# Patient Record
Sex: MALE | Race: WHITE
[De-identification: names, ages, dates, MRNs, and addresses within clinical notes are randomized per-mention and may not be internally consistent; named-entity substitution may affect disease eponyms.]

---

## 2017-10-24 ENCOUNTER — HOSPITAL ENCOUNTER (OUTPATIENT)
Dept: HOSPITAL 92 - ERS | Age: 77
Setting detail: OBSERVATION
LOS: 2 days | Discharge: HOME | End: 2017-10-26
Attending: FAMILY MEDICINE | Admitting: FAMILY MEDICINE
Payer: MEDICARE

## 2017-10-24 VITALS — BODY MASS INDEX: 30.6 KG/M2

## 2017-10-24 DIAGNOSIS — Z98.890: ICD-10-CM

## 2017-10-24 DIAGNOSIS — M19.90: ICD-10-CM

## 2017-10-24 DIAGNOSIS — N40.0: ICD-10-CM

## 2017-10-24 DIAGNOSIS — Z88.0: ICD-10-CM

## 2017-10-24 DIAGNOSIS — F32.9: ICD-10-CM

## 2017-10-24 DIAGNOSIS — I25.10: ICD-10-CM

## 2017-10-24 DIAGNOSIS — Z95.818: ICD-10-CM

## 2017-10-24 DIAGNOSIS — E11.9: ICD-10-CM

## 2017-10-24 DIAGNOSIS — J44.9: ICD-10-CM

## 2017-10-24 DIAGNOSIS — G89.4: ICD-10-CM

## 2017-10-24 DIAGNOSIS — H40.9: ICD-10-CM

## 2017-10-24 DIAGNOSIS — Z98.1: ICD-10-CM

## 2017-10-24 DIAGNOSIS — G43.909: ICD-10-CM

## 2017-10-24 DIAGNOSIS — K21.9: ICD-10-CM

## 2017-10-24 DIAGNOSIS — I25.2: ICD-10-CM

## 2017-10-24 DIAGNOSIS — I50.9: ICD-10-CM

## 2017-10-24 DIAGNOSIS — R07.89: Primary | ICD-10-CM

## 2017-10-24 DIAGNOSIS — Z79.899: ICD-10-CM

## 2017-10-24 DIAGNOSIS — F17.290: ICD-10-CM

## 2017-10-24 DIAGNOSIS — D53.9: ICD-10-CM

## 2017-10-24 DIAGNOSIS — Z79.84: ICD-10-CM

## 2017-10-24 DIAGNOSIS — Z79.891: ICD-10-CM

## 2017-10-24 DIAGNOSIS — I11.0: ICD-10-CM

## 2017-10-24 DIAGNOSIS — Z79.02: ICD-10-CM

## 2017-10-24 DIAGNOSIS — E78.5: ICD-10-CM

## 2017-10-24 LAB
ALP SERPL-CCNC: 87 U/L (ref 40–150)
ALT SERPL W P-5'-P-CCNC: 14 U/L (ref 8–55)
ANION GAP SERPL CALC-SCNC: 13 MMOL/L (ref 10–20)
AST SERPL-CCNC: 12 U/L (ref 5–34)
BASOPHILS # BLD AUTO: 0.1 THOU/UL (ref 0–0.2)
BASOPHILS NFR BLD AUTO: 1.1 % (ref 0–1)
BILIRUB SERPL-MCNC: 0.6 MG/DL (ref 0.2–1.2)
BUN SERPL-MCNC: 18 MG/DL (ref 8.4–25.7)
CALCIUM SERPL-MCNC: 9.6 MG/DL (ref 7.8–10.44)
CHLORIDE SERPL-SCNC: 104 MMOL/L (ref 98–107)
CK SERPL-CCNC: 75 U/L (ref 30–200)
CO2 SERPL-SCNC: 27 MMOL/L (ref 23–31)
CREAT CL PREDICTED SERPL C-G-VRATE: 0 ML/MIN (ref 70–130)
EOSINOPHIL # BLD AUTO: 0.1 THOU/UL (ref 0–0.7)
EOSINOPHIL NFR BLD AUTO: 0.6 % (ref 0–10)
GLOBULIN SER CALC-MCNC: 2.5 G/DL (ref 2.4–3.5)
HCT VFR BLD CALC: 38.5 % (ref 42–52)
LIPASE SERPL-CCNC: 53 U/L (ref 8–78)
LYMPHOCYTES # BLD: 3 THOU/UL (ref 1.2–3.4)
LYMPHOCYTES NFR BLD AUTO: 23.4 % (ref 21–51)
MONOCYTES # BLD AUTO: 0.8 THOU/UL (ref 0.11–0.59)
MONOCYTES NFR BLD AUTO: 6.2 % (ref 0–10)
NEUTROPHILS # BLD AUTO: 8.8 THOU/UL (ref 1.4–6.5)
RBC # BLD AUTO: 3.9 MILL/UL (ref 4.7–6.1)
TROPONIN I SERPL DL<=0.01 NG/ML-MCNC: (no result) NG/ML (ref ?–0.03)
WBC # BLD AUTO: 12.8 THOU/UL (ref 4.8–10.8)

## 2017-10-24 PROCEDURE — 82553 CREATINE MB FRACTION: CPT

## 2017-10-24 PROCEDURE — 82550 ASSAY OF CK (CPK): CPT

## 2017-10-24 PROCEDURE — 90471 IMMUNIZATION ADMIN: CPT

## 2017-10-24 PROCEDURE — 80061 LIPID PANEL: CPT

## 2017-10-24 PROCEDURE — 99285 EMERGENCY DEPT VISIT HI MDM: CPT

## 2017-10-24 PROCEDURE — 71010: CPT

## 2017-10-24 PROCEDURE — 93005 ELECTROCARDIOGRAM TRACING: CPT

## 2017-10-24 PROCEDURE — 36415 COLL VENOUS BLD VENIPUNCTURE: CPT

## 2017-10-24 PROCEDURE — A4216 STERILE WATER/SALINE, 10 ML: HCPCS

## 2017-10-24 PROCEDURE — 83880 ASSAY OF NATRIURETIC PEPTIDE: CPT

## 2017-10-24 PROCEDURE — 78452 HT MUSCLE IMAGE SPECT MULT: CPT

## 2017-10-24 PROCEDURE — 93017 CV STRESS TEST TRACING ONLY: CPT

## 2017-10-24 PROCEDURE — A9500 TC99M SESTAMIBI: HCPCS

## 2017-10-24 PROCEDURE — 90732 PPSV23 VACC 2 YRS+ SUBQ/IM: CPT

## 2017-10-24 PROCEDURE — 82962 GLUCOSE BLOOD TEST: CPT

## 2017-10-24 PROCEDURE — G0009 ADMIN PNEUMOCOCCAL VACCINE: HCPCS

## 2017-10-24 PROCEDURE — 85027 COMPLETE CBC AUTOMATED: CPT

## 2017-10-24 PROCEDURE — G0378 HOSPITAL OBSERVATION PER HR: HCPCS

## 2017-10-24 PROCEDURE — 84484 ASSAY OF TROPONIN QUANT: CPT

## 2017-10-24 PROCEDURE — 85025 COMPLETE CBC W/AUTO DIFF WBC: CPT

## 2017-10-24 PROCEDURE — 83690 ASSAY OF LIPASE: CPT

## 2017-10-24 PROCEDURE — 80053 COMPREHEN METABOLIC PANEL: CPT

## 2017-10-24 PROCEDURE — 36416 COLLJ CAPILLARY BLOOD SPEC: CPT

## 2017-10-24 PROCEDURE — 94760 N-INVAS EAR/PLS OXIMETRY 1: CPT

## 2017-10-24 PROCEDURE — 80048 BASIC METABOLIC PNL TOTAL CA: CPT

## 2017-10-24 PROCEDURE — 85007 BL SMEAR W/DIFF WBC COUNT: CPT

## 2017-10-24 PROCEDURE — 96374 THER/PROPH/DIAG INJ IV PUSH: CPT

## 2017-10-24 PROCEDURE — 93306 TTE W/DOPPLER COMPLETE: CPT

## 2017-10-24 NOTE — RAD
FRONTAL RADIOGRAPH CHEST 

10/24/17

 

COMPARISON:  

9/11/12

 

HISTORY: 

Chest pain.

 

FINDINGS:  

There is increased linear interstitial density and pulmonary hyperinflation, stable. Cervical spine 
hardware is present. Dorsal column stimulator lead overlies the mid thoracic spine. There is atheros
clerotic calcification in the aortic arch. There is no pneumothorax, pleural fluid, focal consolidat
ion or alveolar edema. 

 

IMPRESSION:  

No acute findings. Chronic appearing findings as described above. 

 

POS: [facility]

## 2017-10-25 LAB
ANION GAP SERPL CALC-SCNC: 9 MMOL/L (ref 10–20)
BUN SERPL-MCNC: 17 MG/DL (ref 8.4–25.7)
CALCIUM SERPL-MCNC: 9.4 MG/DL (ref 7.8–10.44)
CHLORIDE SERPL-SCNC: 105 MMOL/L (ref 98–107)
CHOLEST SERPL-MCNC: 122 MG/DL
CO2 SERPL-SCNC: 29 MMOL/L (ref 23–31)
CREAT CL PREDICTED SERPL C-G-VRATE: 0 ML/MIN (ref 70–130)
HCT VFR BLD CALC: 34 % (ref 42–52)
LDLC SERPL CALC-MCNC: 70 MG/DL
RBC # BLD AUTO: 3.41 MILL/UL (ref 4.7–6.1)
TROPONIN I SERPL DL<=0.01 NG/ML-MCNC: (no result) NG/ML (ref ?–0.03)
TROPONIN I SERPL DL<=0.01 NG/ML-MCNC: (no result) NG/ML (ref ?–0.03)
WBC # BLD AUTO: 8.6 THOU/UL (ref 4.8–10.8)

## 2017-10-25 RX ADMIN — MORPHINE SULFATE SCH MG: 10 SOLUTION ORAL at 20:31

## 2017-10-25 RX ADMIN — MORPHINE SULFATE SCH: 10 SOLUTION ORAL at 21:44

## 2017-10-25 RX ADMIN — MORPHINE SULFATE SCH: 10 SOLUTION ORAL at 18:03

## 2017-10-25 RX ADMIN — MORPHINE SULFATE SCH MG: 10 SOLUTION ORAL at 18:27

## 2017-10-25 RX ADMIN — MORPHINE SULFATE SCH: 10 SOLUTION ORAL at 22:57

## 2017-10-25 RX ADMIN — MORPHINE SULFATE SCH: 10 SOLUTION ORAL at 20:02

## 2017-10-25 NOTE — HP
PRIMARY CARE PROVIDER:  [facility] in [place], [place].

 

PRIMARY CARDIOLOGIST:  Forrest Hoover M.D.

 

CHIEF COMPLAINT:  Chest pain.

 

HISTORY OF PRESENT ILLNESS:  This is a 77-year-old  male who was referred to [facility] Emergency Room by Dr. Hoover after patient was seen in his office on 10/24/20
17.  The patient states he has had recurrent chest pain requiring multiple doses of nitroglycerin at
 home over the last several weeks concerning him for worsening heart failure or coronary artery dise
ase.  The patient does admit to a longstanding history of coronary artery disease status post cardia
c stent placement in 2013 to the obtuse marginal branch.  The patient states he has been compliant w
ith his chronic medication regimen; however, apparently was placed on hospice, unbeknownst to him by
 a provider out of [place], [place] and states he was on hospice for approximately 7 months before be
ing released.  The patient is unsure why he was on hospice and states he was unable to secure follow
 up to Dr. Hoover during the last several months to assess his coronary status.  The patient michelle
es any specific fever, chills, increased cough, congestion, but does states that he has \\"black stepan
g\\" and COPD and uses home inhalers.  The patient denies any recent exposure history, trauma, injur
y or decreased exercise tolerance.  The patient does state he ambulates with the use of four-wheeled
 rolling walker, but is short-winded with mild exertion.  The patient denies any specific increased 
lower extremity edema, increased weight gain or dietary indiscretion.  The patient states that he wa
s en route to follow up with his cardiologist when he developed worsening chest pain requiring 2 dos
es of sublingual nitroglycerin.  The patient presented to the emergency room and receiving a third d
ose of nitroglycerin with resolution of symptoms.  Initial EKG performed in the emergency room shows
 sinus mechanism without acute ST-T wave changes.  The patient was referred to the Hospitalist Karri mir for evaluation.

 

PAST MEDICAL HISTORY:

1.  Coronary artery disease status post cardiac stent placement to the obtuse marginal in 2013.

2.  Diabetes mellitus type 2 on oral hypoglycemics.

3.  History of myocardial infarction.

4.  Hypertension.

5.  Dyslipidemia.

6.  Osteoarthritis.

7.  Glaucoma.

8.  Depression.

9.  History of migraine headaches.

10.  Chronic obstructive pulmonary disease/\\"black lung\\".

 

PAST SURGICAL HISTORY:

1.  Status post cardiac stent placement to the obtuse marginal in 2013.

2.  Status post pancreatic surgery.

3.  Status post anterior cervical diskectomy with arthrodesis and fusion of the cervical spine.

 

CURRENT MEDICATIONS:

1.  BuSpar 10 mg p.o. t.i.d.

2.  Plavix 75 mg 1 tab p.o. daily.

3.  Donepezil 10 mg 1 tab p.o. at bedtime.

4.  Protonix 40 mg p.o. daily.

5.  Sertraline 150 mg p.o. daily.

6.  Tamsulosin 0.4 mg p.o. at bedtime.

7.  Trazodone 100 mg 2 tablets p.o. at bedtime.

8.  Oxycodone/acetaminophen 7.5/325 mg 1 tab p.o. q.4-6 h. p.r.n.

9.  Methadone 10 mg 1 tab p.o. t.i.d.

10.  Morphine sulfate, dose unknown.

 

ALLERGIES:  PENICILLIN.

 

FAMILY HISTORY:  Positive for hypertension and coronary artery disease.

 

SOCIAL HISTORY:  The patient resides near [facility].  Smokes cigars daily.  No c
urrent alcohol or illicit drug use.  [employer] .

 

REVIEW OF SYSTEMS:  The following complete review of systems was negative, unless otherwise mentione
d in the HPI or below:

Constitutional:  Weight loss or gain, ability to conduct usual activities.

Skin:  Rash, itching.

Eyes:  Double vision, pain.

ENT/Mouth:  Nose bleeding, neck stiffness, pain, tenderness.

Cardiovascular:  Palpitations, dyspnea on exertion, orthopnea.

Respiratory:  Shortness of breath, wheezing, cough, hemoptysis, fever or night sweats.

Gastrointestinal:  Poor appetite, abdominal pain, heartburn, nausea, vomiting, constipation, or diar
bhumi.

Genitourinary:  Urgency, frequency, dysuria, nocturia.

Musculoskeletal:  Pain, swelling.

Neurologic/Psychiatric:  Anxiety, depression.

Allergy/Immunologic:  Skin rash, bleeding tendency.

Otherwise, negative except as stated per HPI.

 

PHYSICAL EXAMINATION:

VITAL SIGNS:  Currently, blood pressure 117/68, pulse 85, respiratory rate 22, temperature 97.8 degr
ees Fahrenheit, O2 saturation 99% on 2 liters per minute by nasal cannula.

GENERAL APPEARANCE:  This is a 77-year-old  male, alert and oriented x3, pleasant, conversa
nt, in no acute distress.

HEENT:  Pupils are equal, round, and reactive to light and accommodation.  Extraocular muscles are i
ntact.  No scleral icterus, no conjunctival injection.  Nares patent.  OP is clear.  Teeth in fair r
epair.

NECK:  Supple, no cervical adenopathy, no thyromegaly, no carotid bruits, no JVD appreciated.  Cervi
nicole spine with full active and passive range of motion.

CHEST:  Lungs are clear to auscultation bilaterally with diminished breath sounds in the bases.

CARDIOVASCULAR:  S1, S2 with distant heart sounds.

ABDOMEN:  Obese, soft, nontender, nondistended.  Bowel sounds are positive in all four quadrants.  T
here is no hepatosplenomegaly, no abdominal bruits, no rebound or guarding appreciated.

EXTREMITIES:  Warm and dry with fair turgor.  No clubbing, cyanosis or asymmetric edema appreciated.
  Pulses palpable distally at the dorsalis pedis, posterior tibial, and popliteal arteries bilateral
ly.  Capillary refill less than 2 seconds.

NEUROLOGIC:  Cranial nerves II-XII are grossly intact.  No focal or lateralizing signs appreciated.

 

PERTINENT LABORATORY AND X-RAY FINDINGS:  Complete metabolic profile within normal limits.  Troponin
 I negative x1.  BNP is 41.  CBC showed a white blood cell count 12.8, hemoglobin 13, hematocrit 39,
 MCV 99, platelet count 239 with normal differential.  Portable chest x-ray dated 10/24/2017 showed 
chronic changes without acute process.  EKG dated 10/24/2017 by my interpretation shows sinus mechan
ism with heart rates in the 60s.  Normal R-wave progression noted in the precordial leads.  Left axi
s deviation noted.  No acute ST-T wave changes appreciated.

 

ASSESSMENT AND PLAN:

1.  Chest pain.  The patient will be placed in observation status.  We will continue aspirin 325 mg 
daily.  Serial troponin I q.3 hours x2.  We will obtain 2D transthoracic echocardiogram.  Consider c
onsultation with Cardiology Service if chest pain recurs.  Nitroglycerin 0.4 mg sublingually every 5
 minutes p.r.n. chest pain.

2.  Chronic obstructive pulmonary disease.  Chronic and stable.  No evidence to suggest acute exacer
bation.  Continue supportive measures.  Bronchodilator therapy p.r.n.

3.  Coronary artery disease, status post cardiac stent placement to the obtuse marginal in 2013.  Co
ntinue Plavix 75 mg p.o. daily.  See #1 above.  No evidence to suggest acute coronary syndrome.

4.  Chronic pain syndrome.  We will confirm home pain medication regimen.  Symptomatic and supportiv
e measures.

5.  Gastroesophageal reflux disease.  Continue Pepcid 20 mg p.o. b.i.d.

6.  Prophylaxis.  Sequential compression devices while in bed.  Pepcid 20 mg p.o. b.i.d.

7.  Code status is full.  Surrogate medical decision maker is the patient's son.

## 2017-10-25 NOTE — NM
CARDIAC SPECT:

10/25/17

 

HISTORY: 

77-year-old male with chest pain, coronary artery disease, stent placement, CHF, diabetes, hypertens
ion, dyslipidemia.

 

TECHNIQUE:

A myocardial perfusion scan was performed using the single isotope one day protocol with technetium 
99m Sestamibi. 9 millicuries was injected intravenously for the rest exam followed by 30 millicuries
 for the stress study. Pharmacologic stress with Lexiscan was monitored and interpreted by MAUREEN Nieves,
 nurse practitioner.

 

FINDINGS:

Homogeneous tracer distribution is seen in the myocardial segments on stress and rest images without
 fixed or reversible defects. 

 

Gating could not be performed due to patient's inconsistent rate and rhythm, thus the LVEF could not
 be calculated and the wall motion could not be evaluated.

 

IMPRESSION:  

Normal myocardial perfusion.

 

POS: [facility]

## 2017-10-25 NOTE — PDOC.PN
- Subjective


Encounter Start Date: 10/25/17


Encounter Start Time: 16:00


Subjective: f/u for CP and CST showing no reversible defects. No current CP.


-: c/o chronic back pain symptoms. 





- Objective


Resuscitation Status: 


 











Resuscitation Status           FULL:Full Resuscitation














MAR Reviewed: Yes


Vital Signs & Weight: 


 Vital Signs (12 hours)











  Temp Pulse Resp BP BP Pulse Ox


 


 10/25/17 14:49  97.6 F  92  20   122/66  98


 


 10/25/17 13:07     130/74  


 


 10/25/17 11:13  97.8 F  76  20   120/66  97


 


 10/25/17 08:42  97.6 F  86  22 H   134/73  97


 


 10/25/17 08:00  97.6 F  86  22 H  134/73   97








 Weight











Weight                         245 lb














I&O: 


 











 10/24/17 10/25/17 10/26/17





 06:59 06:59 06:59


 


Intake Total  260 


 


Output Total  1 


 


Balance  259 











Result Diagrams: 


 10/25/17 03:18





 10/25/17 03:18


Additional Labs: 


 Accuchecks











  10/25/17 10/25/17 10/24/17





  10:46 06:38 22:00


 


POC Glucose  106  108  109








 Laboratory Tests











  10/24/17 10/24/17 10/25/17





  16:00 22:54 00:25


 


WBC  12.8 H  


 


Hgb  13.0 L  


 


Troponin I   Less than  0.010  Less than  0.010


 


Triglycerides   


 


Cholesterol   


 


LDL Cholesterol, Calc   


 


HDL Cholesterol   














  10/25/17 10/25/17





  03:18 03:18


 


WBC  


 


Hgb  


 


Troponin I  Less than  0.010 


 


Triglycerides   106


 


Cholesterol   122


 


LDL Cholesterol, Calc   70


 


HDL Cholesterol   31











Radiology Reviewed by me: Yes (CST - no reversible or fixed ischemia, no EF 

calculated)


EKG Reviewed by me: Yes (Tele - SR in 60's)





Phys Exam





- Physical Examination


Constitutional: NAD


HEENT: PERRLA, oral pharynx no lesions


Neck: no JVD, supple


Respiratory: no wheezing, clear to auscultation bilateral


Cardiovascular: RRR


Gastrointestinal: soft, non-tender, no distention, positive bowel sounds


Musculoskeletal: no edema, pulses present


Neurological: normal sensation, moves all 4 limbs


Psychiatric: A&O x 3


Skin: normal turgor, cap refill <2 seconds





Dx/Plan


(1) Chest pain


Code(s): R07.9 - CHEST PAIN, UNSPECIFIED   Status: Acute   Comment: CST 

negative for reversible ischemia, await 2D echo results   





(2) COPD (chronic obstructive pulmonary disease)


Status: Chronic   Comment: Stable, no exacerbation, continue low-flow O2 via NC

   





(3) Chronic back pain


Code(s): M54.9 - DORSALGIA, UNSPECIFIED; G89.29 - OTHER CHRONIC PAIN   Status: 

Chronic   Comment: Resume home pain medication regimen   





(4) Macrocytic anemia


Code(s): D53.9 - NUTRITIONAL ANEMIA, UNSPECIFIED   Status: Chronic   Comment: 

Check B12/Folate in am   





- Plan


, out of bed/ambulate, DVT proph w/SCDs


Stable overall


-: Resume home Plavix 75mg daily


-: D/C ASA


-: Resume home pain medications including Methadone


-: Await 2D echo results





* Likely home in am

## 2017-10-26 VITALS — DIASTOLIC BLOOD PRESSURE: 60 MMHG | SYSTOLIC BLOOD PRESSURE: 127 MMHG

## 2017-10-26 VITALS — TEMPERATURE: 98.4 F

## 2017-10-26 RX ADMIN — MORPHINE SULFATE SCH: 10 SOLUTION ORAL at 06:01

## 2017-10-26 RX ADMIN — MORPHINE SULFATE SCH: 10 SOLUTION ORAL at 15:55

## 2017-10-26 RX ADMIN — MORPHINE SULFATE SCH MG: 10 SOLUTION ORAL at 09:51

## 2017-10-26 RX ADMIN — MORPHINE SULFATE SCH: 10 SOLUTION ORAL at 04:27

## 2017-10-26 RX ADMIN — MORPHINE SULFATE SCH MG: 10 SOLUTION ORAL at 06:04

## 2017-10-26 RX ADMIN — MORPHINE SULFATE SCH: 10 SOLUTION ORAL at 09:23

## 2017-10-26 RX ADMIN — MORPHINE SULFATE SCH: 10 SOLUTION ORAL at 12:44

## 2017-10-26 RX ADMIN — MORPHINE SULFATE SCH: 10 SOLUTION ORAL at 01:12

## 2017-10-26 RX ADMIN — MORPHINE SULFATE SCH: 10 SOLUTION ORAL at 09:51

## 2017-10-26 RX ADMIN — MORPHINE SULFATE SCH: 10 SOLUTION ORAL at 14:02

## 2017-10-26 RX ADMIN — MORPHINE SULFATE SCH: 10 SOLUTION ORAL at 14:00

## 2017-10-26 NOTE — DIS
DATE OF ADMISSION:  10/24/2017

 

DATE OF DISCHARGE:  10/26/2017

 

DISCHARGE DIAGNOSES:

1.  Chest pain, noncardiac.

2.  History of coronary artery disease with currently normal nuclear stress test and echocardiogram 
as outlined below.

3.  Chronic back pain.

4.  Chronic obstructive pulmonary disease.

5.  Chronic macrocytic anemia.

6.  Diabetes mellitus, type 2.

7.  Benign prostatic hypertrophy.

8.  Gastroesophageal reflux disease.

 

CONSULTATIONS:  None.

 

PROCEDURES:

1.  A 2D echocardiogram:  Performed on 10/25/2017:  EF 50%-55%, E/A reversal suggesting diastolic dy
sfunction, mild-to-moderate TR, inferior wall hypokinesis.

2.  Nuclear stress test:  No reversible ischemia.

 

HISTORY AND PHYSICAL:  Mr. Gaitan is a 77-year-old gentleman, [facility]  He has a hi
story of coronary artery disease, COPD, and a past history of tobacco abuse.  He was admitted for ch
est pain.  Negative biomarkers in the ER.

 

HOSPITAL COURSE:  The patient was seen and examined and admitted by Dr. Estrada.  Serial cardiac biomar
kers were unremarkable.  A nuclear stress test was ordered as well as 2D echocardiogram.  Those repo
rts returned this morning with a normal stress test and echocardiogram with a chronic inferior wall 
hypokinesis, otherwise preserved EF from previous.  The patient had no further chest pain and was st
able for discharge with outpatient followup.

 

PHYSICAL EXAMINATION:  The patient was seen and examined on the day of discharge. 

 

Discharge plan and disposition were discussed with the patient face-to-face at the bedside.

 

DISCHARGE MEDICATIONS:  Patient is being discharged on known medications.

1.  Albuterol 1 puff inhaled q.i.d. p.r.n.

2.  Plavix 75 mg daily.

3.  Aricept 5 mg at bedtime.

4.  Proscar 5 mg daily.

5.  Lasix 20 mg daily.

6.  DuoNeb 3 nebs q.i.d.

7.  Losartan 25 mg daily.

8.  Methadone 10 mg p.o. t.i.d.

9.  Metoprolol tartrate 25 mg p.o. b.i.d.

10.  MSIR 1 mg p.o. 1 hour p.r.n.

11.  Oxycodone/acetaminophen 7.5/325 one tablet b.i.d.

12.  Protonix 40 mg daily.

13.  K-Dur 20 mEq daily.

14.  Zoloft 150 mg p.o. at bedtime.

15.  Flomax 0.4 mg daily.

16.  Buspirone 10 mg p.o. t.i.d.

17.  Metformin 500 mg p.o. b.i.d. with meals.

15.  Trazodone 200 mg p.o. q.h.s.

 

FOLLOWUP APPOINTMENTS:

1[facility]  [facility]

2.  All other medical specialists as previously scheduled.

 

DISCHARGE CONDITION:  Good.

 

DISPOSITION:  The patient being discharged home via private vehicle.

 

The patient is instructed to return to the emergency department if he has any further chest pains.

## 2019-01-09 ENCOUNTER — HOSPITAL ENCOUNTER (OUTPATIENT)
Dept: HOSPITAL 92 - CT | Age: 79
Discharge: HOME | End: 2019-01-09
Attending: INTERNAL MEDICINE
Payer: MEDICARE

## 2019-01-09 DIAGNOSIS — I71.2: Primary | ICD-10-CM

## 2019-01-09 LAB — ESTIMATED GFR-MDRD - POC: 49

## 2019-01-09 PROCEDURE — 71275 CT ANGIOGRAPHY CHEST: CPT

## 2019-01-09 PROCEDURE — 82565 ASSAY OF CREATININE: CPT

## 2019-01-09 NOTE — CT
CTA OF THE CHEST WITH CONTRAST

1/9/19

 

COMPARISON:  

1/16/17, 6/14/16, 7/2/15.

 

TECHNIQUE:  

Multiple contiguous axial images were obtained in a CTA of the chest with contrast. 3D sagittal and c
oronal MIP reformats were performed. 

 

FINDINGS:  

There is enlargement of the ascending aorta which measures 4.7 cm in greatest dimension. This is mini
[place]ika enlarged compared to multiple recent examinations dating back to 2015. The descending aorta is 
normal in caliber measuring 3.1 cm in greatest dimension. Atherosclerotic calcifications are seen in 
the coronary arteries and aorta. No hilar or mediastinal lymphadenopathy are seen. The heart is alejandro
l in size without focal cardiac abnormality. 

 

No pneumothorax or pleural effusion are seen. No suspicious pulmonary nodules are seen. No focal infi
ltrates are seen in the lungs. The patient is status post cholecystectomy. The other visualized subdi
aphragmatic structures are unremarkable. The chest wall soft tissues are unremarkable. Degenerative c
hanges are seen in the spine. A spinal stimulation device is seen in the mid thoracic spine. 

 

IMPRESSION:  

Stable aneurysmal dilatation of the ascending aorta. 

 

POS: [facility]